# Patient Record
Sex: FEMALE | Race: WHITE | NOT HISPANIC OR LATINO | URBAN - METROPOLITAN AREA
[De-identification: names, ages, dates, MRNs, and addresses within clinical notes are randomized per-mention and may not be internally consistent; named-entity substitution may affect disease eponyms.]

---

## 2023-05-09 DIAGNOSIS — N63.25 BREAST LUMP ON LEFT SIDE AT 6 O'CLOCK POSITION: Primary | ICD-10-CM

## 2023-05-16 PROBLEM — N63.11 BREAST LUMP ON RIGHT SIDE AT 11 O'CLOCK POSITION: Status: ACTIVE | Noted: 2023-05-16

## 2023-05-16 PROBLEM — N63.25 BREAST LUMP ON LEFT SIDE AT 6 O'CLOCK POSITION: Status: ACTIVE | Noted: 2023-05-16

## 2023-05-16 PROBLEM — N60.11 DIFFUSE CYSTIC MASTOPATHY OF BOTH BREASTS: Status: ACTIVE | Noted: 2023-05-16

## 2023-05-16 PROBLEM — N60.12 DIFFUSE CYSTIC MASTOPATHY OF BOTH BREASTS: Status: ACTIVE | Noted: 2023-05-16

## 2023-05-24 DIAGNOSIS — N63.25 BREAST LUMP ON LEFT SIDE AT 6 O'CLOCK POSITION: Primary | ICD-10-CM

## 2023-05-30 ENCOUNTER — OFFICE VISIT (OUTPATIENT)
Dept: SURGERY | Facility: CLINIC | Age: 68
End: 2023-05-30
Payer: MEDICARE

## 2023-05-30 VITALS — BODY MASS INDEX: 18.61 KG/M2 | WEIGHT: 105 LBS | HEIGHT: 63 IN

## 2023-05-30 DIAGNOSIS — N63.11 BREAST LUMP ON RIGHT SIDE AT 11 O'CLOCK POSITION: ICD-10-CM

## 2023-05-30 DIAGNOSIS — N60.12 DIFFUSE CYSTIC MASTOPATHY OF BOTH BREASTS: ICD-10-CM

## 2023-05-30 DIAGNOSIS — N60.11 DIFFUSE CYSTIC MASTOPATHY OF BOTH BREASTS: ICD-10-CM

## 2023-05-30 DIAGNOSIS — N63.25 BREAST LUMP ON LEFT SIDE AT 6 O'CLOCK POSITION: Primary | ICD-10-CM

## 2023-05-30 PROCEDURE — 99213 OFFICE O/P EST LOW 20 MIN: CPT | Mod: S$GLB,,, | Performed by: NURSE PRACTITIONER

## 2023-05-30 PROCEDURE — 99213 PR OFFICE/OUTPT VISIT, EST, LEVL III, 20-29 MIN: ICD-10-PCS | Mod: S$GLB,,, | Performed by: NURSE PRACTITIONER

## 2023-05-30 RX ORDER — FUROSEMIDE 20 MG/1
20 TABLET ORAL DAILY PRN
COMMUNITY
Start: 2022-09-12

## 2023-05-30 RX ORDER — OXYBUTYNIN CHLORIDE 5 MG/1
5 TABLET, EXTENDED RELEASE ORAL
COMMUNITY
Start: 2023-04-20

## 2023-05-30 RX ORDER — CYCLOBENZAPRINE HCL 10 MG
10 TABLET ORAL EVERY 8 HOURS PRN
COMMUNITY
Start: 2023-04-29

## 2023-05-30 RX ORDER — ESTROGENS, CONJUGATED 0.62 MG/1
0.62 TABLET, FILM COATED ORAL
COMMUNITY
Start: 2023-05-20

## 2023-05-30 NOTE — PROGRESS NOTES
[facility]  MD Ignacio Rodgers, NP-C    Chief Complaint:   Malika Santos is a 67 y.o. female presenting today for  annual follow up. She is due for Mammogram  She reports no interval changes on her self-breast examination.     History of Present Illness:   Mrs. Santos first presented June 22, 2015 with a left breast lump that on imaging was consistent with benign cystic change. She presented back on February 3, 2021 to re-establish care. MD:::Vania Caro MD.    Past Medical History:   Diagnosis Date    Breast lump on left side at 6 o'clock position 5/16/2023    Breast lump on right side at 11 o'clock position 5/16/2023    Diffuse cystic mastopathy of both breasts 5/16/2023      No past surgical history on file.   No current outpatient medications on file.   Review of patient's allergies indicates:  Not on File   Social History     Tobacco Use    Smoking status: Not on file    Smokeless tobacco: Not on file   Substance Use Topics    Alcohol use: Not on file      No family history on file.     Review of Systems   Integumentary:  Negative for color change, rash, mole/lesion, breast mass, breast discharge and breast tenderness.   Breast: Negative for mass and tenderness     Physical Exam   HENT:   Head: Normocephalic.   Pulmonary/Chest: Right breast exhibits mass (scattered nodular changes noted). Right breast exhibits no inverted nipple, no nipple discharge, no skin change and no tenderness. Left breast exhibits mass (scattered nodular changes noted). Left breast exhibits no inverted nipple, no nipple discharge, no skin change and no tenderness. No breast swelling.   Genitourinary: No breast swelling.   Musculoskeletal: Lymphadenopathy:      Upper Body:      Right upper body: No supraclavicular or axillary adenopathy.      Left upper body: No supraclavicular or axillary adenopathy.     Neurological: She is alert.      Mammogram: The breast tissue is  extremely dense, which significantly lowers the sensitivity of mammography. Circumscribed masses are identified within the inferior medial aspect of the left breast. No spiculation or architectural distortion is identified. No tumor calcification is seen within either breast.    Ultrasound: Sonographic images of the left breast demonstrate evidence of numerous cysts within the inferior lateral aspect of the breast. The largest of these measures 1.7 cm by 1.1 cm by 1.4 cm. At the 9 o'clock N 2 position. No solid lesion is identified.      Assessment/Plan  1. Breast lump on left side at 6 o'clock position  Assessment & Plan:  We reviewed our findings today and her questions were answered.  She understands that her imaging and exams have remained stable (and show nothing concerning).  She is comfortable being followed in a conservative fashion.      She understands the importance of monthly self-breast examination and knows to report any and all changes as they occur.        2. Breast lump on right side at 11 o'clock position  Assessment & Plan:  Same as above        3. Diffuse cystic mastopathy of both breasts  Assessment & Plan:  We discussed our Fibrocystic Mastopathy Protocol in detail. She should take Vitamin E 800 IU everyday x 3 months or until non-tender then can stop Vitamin E vs. continue daily at 400 IU.  The use of ice packs or warms soaks to tender area of the breast may also be of some benefit.  If warm soaks help her tenderness - She can use Aspercreme (unless allergic to Aspirin) on the affected area.  Ibuprofen (if no contraindications) at 800 mg three times per day for 5 days can also relieve many symptoms associated with swollen or inflamed tissue.  She can repeat Ibuprofen for 5 days, but then should be off for 5 days as it may cause gastric upset.  It is a good idea to wear a tight bra during the day and night to minimize movement of the tender area (Sports Bras work well).  Evening Primrose Oil  can be bought over the counter and used at a dose of 3000 mg per day to help with any breast pain/tenderness not improved by implementing the above measures.               Medical Decision Making:  It is my impression that this patient suffers all conditions contained in this medical document.  Each of these conditions did affect our plan of care and my medical decision making today.  It is my opinion that the medical decision making concerning this patient was of moderate difficulty based on the aforementioned conditions.  Any further recommendations will be communicated to the patient by me.  I have reviewed and verified her allergies, list of medications, medical and surgical histories, social history, and a pertinent review of symptoms.      Follow up:  1 year and prn    For:  DAYDAY (GARTH) at

## 2024-07-14 DIAGNOSIS — N63.25 BREAST LUMP ON LEFT SIDE AT 6 O'CLOCK POSITION: Primary | ICD-10-CM

## 2024-07-28 DIAGNOSIS — N63.25 BREAST LUMP ON LEFT SIDE AT 6 O'CLOCK POSITION: Primary | ICD-10-CM

## 2024-08-07 ENCOUNTER — OFFICE VISIT (OUTPATIENT)
Dept: SURGERY | Facility: CLINIC | Age: 69
End: 2024-08-07
Payer: MEDICARE

## 2024-08-07 DIAGNOSIS — N60.11 DIFFUSE CYSTIC MASTOPATHY OF BOTH BREASTS: ICD-10-CM

## 2024-08-07 DIAGNOSIS — N63.11 BREAST LUMP ON RIGHT SIDE AT 11 O'CLOCK POSITION: ICD-10-CM

## 2024-08-07 DIAGNOSIS — N60.12 DIFFUSE CYSTIC MASTOPATHY OF BOTH BREASTS: ICD-10-CM

## 2024-08-07 DIAGNOSIS — N63.25 BREAST LUMP ON LEFT SIDE AT 6 O'CLOCK POSITION: Primary | ICD-10-CM

## 2024-08-07 PROCEDURE — 99213 OFFICE O/P EST LOW 20 MIN: CPT | Mod: S$PBB,,, | Performed by: NURSE PRACTITIONER

## 2024-08-07 PROCEDURE — 99212 OFFICE O/P EST SF 10 MIN: CPT | Mod: PBBFAC,PN | Performed by: NURSE PRACTITIONER

## 2024-08-07 PROCEDURE — 99999 PR PBB SHADOW E&M-EST. PATIENT-LVL II: CPT | Mod: PBBFAC,,, | Performed by: NURSE PRACTITIONER

## 2025-04-07 ENCOUNTER — TELEPHONE (OUTPATIENT)
Dept: SURGERY | Facility: CLINIC | Age: 70
End: 2025-04-07
Payer: MEDICARE

## 2025-04-07 NOTE — TELEPHONE ENCOUNTER
I have attempted without success to contact this patient by phone to return their call and I left a message on answering machine with clinic call back number [phone].

## 2025-04-07 NOTE — TELEPHONE ENCOUNTER
----- Message from Med Assistant Prado sent at 4/3/2025  4:49 PM CDT -----  Type:  Appointment Request  Name of Caller:pt When is the first available appointment?No accessSymptoms:lump in breast Would the patient rather a call back or a response via [facility]? Call Best Call Back Number:[phone] Information: Pt is looking to schedule an apt because she noticed some lumps inside of her left breast. Please give pt a call back to discuss further. (I could not find an apt with TERELL Richards or Dr. Antonio to schedule for her)

## 2025-04-11 ENCOUNTER — TELEPHONE (OUTPATIENT)
Dept: SURGERY | Facility: CLINIC | Age: 70
End: 2025-04-11
Payer: MEDICARE

## 2025-04-11 NOTE — TELEPHONE ENCOUNTER
I have attempted without success to contact this patient by phone to return their call and I left a message on answering machine with clinic call back number [phone].

## 2025-04-11 NOTE — TELEPHONE ENCOUNTER
Returned pt called to discuss setting her up for an appt for a lump she stayed she found in her breast. Pt was unaware the Dr. Galdamez and Leonard Richards were no longer with the company. Offered pt an appt for Monday 3/14/2025 with Dr. Edwards pt stated she had to call me back due to her cats were fighting outside and she already spent $800 at the vet. Gave pt a few minutes. Gave pt another call to finish discussing about her appt pt declined stating she would like to see Leonard Richards due to her knowing her history. Provided pt with Noelle Hogue Jaime number [phone]. Pt stated she will call back if she change her mind. [facility] [place] was given to pt [phone]. Pt v/u.

## 2025-04-11 NOTE — TELEPHONE ENCOUNTER
----- Message from Una Bradley sent at 4/10/2025  3:48 PM CDT -----  Contact: Malika  .Type: Patient  Requesting Call BackWho Called: Jeovany is this regarding?: AppointmentWould the patient rather a call back or a response via [facility]?  Call Juan Jose Call Back Number:  [phone] Information:  Patient is calling to schedule an appointment as soon as possible. She says she has a lump that needs to be looked at. Please Call Back.

## 2025-05-26 ENCOUNTER — TELEPHONE (OUTPATIENT)
Dept: SURGERY | Facility: CLINIC | Age: 70
End: 2025-05-26
Payer: MEDICARE

## 2025-05-26 NOTE — TELEPHONE ENCOUNTER
Spoke with pt regarding cancellation of future visit with [facility] due to relocation; pt verbally responded in the affirmative and requested contact info for rescheduling with [facility]; contact number given to pt per verbal request; pt did not have any questions or concerns prior to call ending.    (PT FACESHEET & LVN FAXED TO [facility] @ [phone] FOR RESCHEDULING WITH [facility]); pt did not have any questions or concerns prior to call ending.